# Patient Record
Sex: FEMALE | Race: WHITE | HISPANIC OR LATINO | Employment: FULL TIME | URBAN - METROPOLITAN AREA
[De-identification: names, ages, dates, MRNs, and addresses within clinical notes are randomized per-mention and may not be internally consistent; named-entity substitution may affect disease eponyms.]

---

## 2023-08-14 ENCOUNTER — PRE-ADMISSION TESTING (OUTPATIENT)
Dept: PREADMISSION TESTING | Facility: HOSPITAL | Age: 38
End: 2023-08-14
Payer: COMMERCIAL

## 2023-08-14 PROCEDURE — 93005 ELECTROCARDIOGRAM TRACING: CPT

## 2023-08-14 NOTE — PAT
Blood work from Pt echart faxed along with EKG to [facility].     [facility] instruction sheet reviewed with pt and  who verbalized understanding

## 2023-08-18 LAB
QT INTERVAL: 398 MS
QTC INTERVAL: 417 MS

## 2023-08-25 ENCOUNTER — LAB REQUISITION (OUTPATIENT)
Dept: LAB | Facility: HOSPITAL | Age: 38
End: 2023-08-25
Payer: COMMERCIAL

## 2023-08-25 DIAGNOSIS — K82.8 OTHER SPECIFIED DISEASES OF GALLBLADDER: ICD-10-CM

## 2023-08-25 PROCEDURE — 88304 TISSUE EXAM BY PATHOLOGIST: CPT

## 2023-08-29 LAB — REF LAB TEST METHOD: NORMAL

## 2023-08-31 ENCOUNTER — HOSPITAL ENCOUNTER (OUTPATIENT)
Dept: CARDIOLOGY | Facility: HOSPITAL | Age: 38
Discharge: HOME | End: 2023-08-31
Payer: COMMERCIAL

## 2023-08-31 ENCOUNTER — TRANSCRIBE ORDERS (OUTPATIENT)
Dept: ADMINISTRATIVE | Facility: HOSPITAL | Age: 38
End: 2023-08-31
Payer: COMMERCIAL

## 2023-08-31 VITALS — BODY MASS INDEX: 28 KG/M2 | HEIGHT: 63 IN | WEIGHT: 158 LBS

## 2023-08-31 DIAGNOSIS — M79.604 RIGHT LEG PAIN: Primary | ICD-10-CM

## 2023-08-31 DIAGNOSIS — M79.604 RIGHT LEG PAIN: ICD-10-CM

## 2023-08-31 LAB
BH CV LOWER VASCULAR LEFT COMMON FEMORAL AUGMENT: NORMAL
BH CV LOWER VASCULAR LEFT COMMON FEMORAL COMPRESS: NORMAL
BH CV LOWER VASCULAR LEFT COMMON FEMORAL PHASIC: NORMAL
BH CV LOWER VASCULAR LEFT COMMON FEMORAL SPONT: NORMAL
BH CV LOWER VASCULAR RIGHT COMMON FEMORAL AUGMENT: NORMAL
BH CV LOWER VASCULAR RIGHT COMMON FEMORAL COMPRESS: NORMAL
BH CV LOWER VASCULAR RIGHT COMMON FEMORAL PHASIC: NORMAL
BH CV LOWER VASCULAR RIGHT COMMON FEMORAL SPONT: NORMAL
BH CV LOWER VASCULAR RIGHT DISTAL FEMORAL AUGMENT: NORMAL
BH CV LOWER VASCULAR RIGHT DISTAL FEMORAL COMPRESS: NORMAL
BH CV LOWER VASCULAR RIGHT DISTAL FEMORAL PHASIC: NORMAL
BH CV LOWER VASCULAR RIGHT DISTAL FEMORAL SPONT: NORMAL
BH CV LOWER VASCULAR RIGHT GASTRONEMIUS COMPRESS: NORMAL
BH CV LOWER VASCULAR RIGHT GREATER SAPH AK COMPRESS: NORMAL
BH CV LOWER VASCULAR RIGHT GREATER SAPH BK COMPRESS: NORMAL
BH CV LOWER VASCULAR RIGHT LESSER SAPH COMPRESS: NORMAL
BH CV LOWER VASCULAR RIGHT MID FEMORAL AUGMENT: NORMAL
BH CV LOWER VASCULAR RIGHT MID FEMORAL COMPRESS: NORMAL
BH CV LOWER VASCULAR RIGHT MID FEMORAL PHASIC: NORMAL
BH CV LOWER VASCULAR RIGHT MID FEMORAL SPONT: NORMAL
BH CV LOWER VASCULAR RIGHT PERONEAL AUGMENT: NORMAL
BH CV LOWER VASCULAR RIGHT PERONEAL COMPRESS: NORMAL
BH CV LOWER VASCULAR RIGHT POPLITEAL AUGMENT: NORMAL
BH CV LOWER VASCULAR RIGHT POPLITEAL COMPRESS: NORMAL
BH CV LOWER VASCULAR RIGHT POPLITEAL PHASIC: NORMAL
BH CV LOWER VASCULAR RIGHT POPLITEAL SPONT: NORMAL
BH CV LOWER VASCULAR RIGHT POSTERIOR TIBIAL AUGMENT: NORMAL
BH CV LOWER VASCULAR RIGHT POSTERIOR TIBIAL COMPRESS: NORMAL
BH CV LOWER VASCULAR RIGHT PROFUNDA FEMORAL AUGMENT: NORMAL
BH CV LOWER VASCULAR RIGHT PROFUNDA FEMORAL PHASIC: NORMAL
BH CV LOWER VASCULAR RIGHT PROFUNDA FEMORAL SPONT: NORMAL
BH CV LOWER VASCULAR RIGHT PROXIMAL FEMORAL AUGMENT: NORMAL
BH CV LOWER VASCULAR RIGHT PROXIMAL FEMORAL COMPRESS: NORMAL
BH CV LOWER VASCULAR RIGHT PROXIMAL FEMORAL PHASIC: NORMAL
BH CV LOWER VASCULAR RIGHT PROXIMAL FEMORAL SPONT: NORMAL
BH CV LOWER VASCULAR RIGHT SAPHENOFEMORAL JUNCTION AUGMENT: NORMAL
BH CV LOWER VASCULAR RIGHT SAPHENOFEMORAL JUNCTION COMPRESS: NORMAL
BH CV LOWER VASCULAR RIGHT SAPHENOFEMORAL JUNCTION PHASIC: NORMAL
BH CV LOWER VASCULAR RIGHT SAPHENOFEMORAL JUNCTION SPONT: NORMAL

## 2023-08-31 PROCEDURE — 93971 EXTREMITY STUDY: CPT

## 2024-07-31 ENCOUNTER — TRANSCRIBE ORDERS (OUTPATIENT)
Dept: ADMINISTRATIVE | Facility: HOSPITAL | Age: 39
End: 2024-07-31
Payer: COMMERCIAL

## 2024-07-31 DIAGNOSIS — R10.33 PERIUMBILICAL PAIN: Primary | ICD-10-CM

## 2024-08-05 ENCOUNTER — HOSPITAL ENCOUNTER (OUTPATIENT)
Dept: CT IMAGING | Facility: HOSPITAL | Age: 39
Discharge: HOME | End: 2024-08-05
Admitting: SURGERY
Payer: COMMERCIAL

## 2024-08-05 DIAGNOSIS — R10.33 PERIUMBILICAL PAIN: ICD-10-CM

## 2024-08-05 PROCEDURE — 74176 CT ABD & PELVIS W/O CONTRAST: CPT

## 2024-10-23 ENCOUNTER — LAB REQUISITION (OUTPATIENT)
Dept: LAB | Facility: HOSPITAL | Age: 39
End: 2024-10-23
Payer: COMMERCIAL

## 2024-10-23 DIAGNOSIS — N92.0 EXCESSIVE AND FREQUENT MENSTRUATION WITH REGULAR CYCLE: ICD-10-CM

## 2024-10-23 PROCEDURE — 88305 TISSUE EXAM BY PATHOLOGIST: CPT | Performed by: OBSTETRICS & GYNECOLOGY

## 2024-10-24 LAB
CYTO UR: NORMAL
LAB AP CASE REPORT: NORMAL
LAB AP CLINICAL INFORMATION: NORMAL
PATH REPORT.FINAL DX SPEC: NORMAL
PATH REPORT.GROSS SPEC: NORMAL

## 2025-06-02 ENCOUNTER — OFFICE VISIT (OUTPATIENT)
Age: 40
End: 2025-06-02
Payer: COMMERCIAL

## 2025-06-02 VITALS
OXYGEN SATURATION: 100 % | SYSTOLIC BLOOD PRESSURE: 100 MMHG | TEMPERATURE: 97.3 F | HEIGHT: 63 IN | DIASTOLIC BLOOD PRESSURE: 62 MMHG | HEART RATE: 77 BPM | WEIGHT: 147.4 LBS | BODY MASS INDEX: 26.12 KG/M2

## 2025-06-02 DIAGNOSIS — K21.9 GERD WITHOUT ESOPHAGITIS: ICD-10-CM

## 2025-06-02 DIAGNOSIS — J30.89 NON-SEASONAL ALLERGIC RHINITIS, UNSPECIFIED TRIGGER: ICD-10-CM

## 2025-06-02 DIAGNOSIS — R06.02 SOB (SHORTNESS OF BREATH): Primary | ICD-10-CM

## 2025-06-02 RX ORDER — PHENAZOPYRIDINE HYDROCHLORIDE 200 MG/1
1 TABLET, FILM COATED ORAL 3 TIMES DAILY
COMMUNITY
Start: 2025-03-22 | End: 2025-06-02

## 2025-06-02 RX ORDER — LISINOPRIL 5 MG/1
1 TABLET ORAL DAILY
COMMUNITY

## 2025-06-02 RX ORDER — POLYETHYLENE GLYCOL 3350 17 G/17G
POWDER, FOR SOLUTION ORAL
COMMUNITY
Start: 2025-03-04

## 2025-06-02 RX ORDER — TIRZEPATIDE 2.5 MG/.5ML
INJECTION, SOLUTION SUBCUTANEOUS
COMMUNITY
Start: 2025-05-28

## 2025-06-02 RX ORDER — NITROFURANTOIN MACROCRYSTALS 100 MG/1
CAPSULE ORAL
COMMUNITY
Start: 2025-05-16

## 2025-06-02 RX ORDER — IRON POLYSACCHARIDE COMPLEX 150 MG
1 CAPSULE ORAL DAILY
COMMUNITY
Start: 2025-05-16

## 2025-06-02 RX ORDER — OMEPRAZOLE 20 MG/1
CAPSULE, DELAYED RELEASE ORAL
COMMUNITY
Start: 2025-05-28

## 2025-06-02 RX ORDER — LEVONORGESTREL/ETHINYL ESTRADIOL 2.6; 2.3 MG/1; MG/1
1 PATCH TRANSDERMAL
COMMUNITY
End: 2025-06-02

## 2025-06-02 RX ORDER — FLUCONAZOLE 200 MG/1
TABLET ORAL SEE ADMIN INSTRUCTIONS
COMMUNITY
Start: 2025-03-22

## 2025-06-02 RX ORDER — CETIRIZINE HYDROCHLORIDE 10 MG/1
10 TABLET ORAL DAILY
COMMUNITY

## 2025-06-02 RX ORDER — NORELGESTROMIN AND ETHINYL ESTRADIOL 35; 150 UG/MG; UG/MG
1 PATCH TRANSDERMAL
COMMUNITY
End: 2025-06-02

## 2025-06-02 NOTE — PROGRESS NOTES
[facility] Pulmonary Initial Evaluation    CHIEF COMPLAINT    Cough    Referred by:  HONORIO Eid    HISTORY OF PRESENT ILLNESS    Leigh H Humes is a 39 y.o.female here today for a evaluation of her persistent cough and shortness of breath.  She states that in April she developed a cough and felt poorly went to her PCPs office.  She had a low oxygen concentration and low blood pressure and elevated heart rate.  She was sent to [facility] and seen in the ED.  It was felt that her symptoms were related to a suspected UTI.  She was treated with antibiotics and her symptoms improved and she was discharged home later that evening.  She states that she felt better after she was treated but her cough came back.  She states now that it is not that bad.  She is seeing urology in the near future for her recurrent UTIs.    Her PCP was concerned and wanted her evaluated here.    She denies any childhood asthma or recurrent infections as a child.  She did have pneumonia at the age of 9.  She denies any chemical or environmental exposures in the past.  She denies any history of lung cancer in first relatives.  She is a lifetime non-smoker.  She does not vape.  She is currently a manager at a [employer].    She has seasonal allergies and will take medication as needed.    She typically coughs up clear secretions.  She denies any hemoptysis.  She denies any fever, chills or night sweats.  She sometimes will cough at nighttime.    She does have reflux symptoms.  She does take omeprazole most of the time.  Sometimes she would forget to take the medication.  She does have some breakthrough symptoms.    She denies any sleeping difficulties.  She does not drink a lot of caffeine.  She tries to limit her caffeine use at nighttime.    She denies any chest pain.  She was told she had a murmur in the past and then had an echocardiogram that showed no evidence of a murmur.  She denies any chest tenderness.  She denies  any palpitations.  She denies any lower extremity edema or calf tenderness.    She currently uses no inhalers.    She denies any shortness of breath with exertion.  She exercises some.    She is accompanied today by her family.    Patient Active Problem List   Diagnosis    SOB (shortness of breath)    GERD without esophagitis    Non-seasonal allergic rhinitis       Allergies   Allergen Reactions    Compazine [Prochlorperazine] Unknown - Low Severity    Morphine Other (See Comments)    Sulfa Antibiotics Other (See Comments)       Current Outpatient Medications:     cetirizine (zyrTEC) 10 MG tablet, Take 1 tablet by mouth Daily., Disp: , Rfl:     EQ ClearLax 17 GM/SCOOP powder, MIX 17 GRAMS (1 SCOOP) OF POWDER IN 8 OUNCES OF LIQUID AND DRINK ONCE DAILY, Disp: , Rfl:     Ferrex 150 150 MG capsule, Take 1 capsule by mouth Daily., Disp: , Rfl:     fluconazole (DIFLUCAN) 200 MG tablet, See Admin Instructions., Disp: , Rfl:     HYDROXYZINE HCL PO, hydrOXYzine HCl, Disp: , Rfl:     lisinopril (PRINIVIL,ZESTRIL) 5 MG tablet, Take 1 tablet by mouth Daily., Disp: , Rfl:     Mounjaro 2.5 MG/0.5ML solution auto-injector, , Disp: , Rfl:     nitrofurantoin (MACRODANTIN) 100 MG capsule, TAKE 1 CAPSULE BY MOUTH ONCE DAILY ON DAYS OF ACTIVITY AS DIRECTED, Disp: , Rfl:     omeprazole (priLOSEC) 20 MG capsule, , Disp: , Rfl:   MEDICATION LIST AND ALLERGIES REVIEWED.    Social History     Tobacco Use    Smoking status: Never     Passive exposure: Never    Smokeless tobacco: Never   Vaping Use    Vaping status: Never Used   Substance Use Topics    Alcohol use: Not Currently    Drug use: Never       FAMILY AND SOCIAL HISTORY REVIEWED.    Review of Systems   Constitutional:  Negative for activity change, appetite change, fatigue, fever and unexpected weight change.   HENT:  Negative for congestion, postnasal drip, rhinorrhea, sinus pressure, sore throat and voice change.    Eyes:  Negative for visual disturbance.   Respiratory:  Positive  "for cough. Negative for chest tightness, shortness of breath and wheezing.    Cardiovascular:  Negative for chest pain, palpitations and leg swelling.   Gastrointestinal:  Negative for abdominal distention, abdominal pain, nausea and vomiting.   Endocrine: Negative for cold intolerance and heat intolerance.   Genitourinary:  Negative for difficulty urinating and urgency.   Musculoskeletal:  Negative for arthralgias, back pain and neck pain.   Skin:  Negative for color change and pallor.   Allergic/Immunologic: Negative for environmental allergies and food allergies.   Neurological:  Negative for dizziness, syncope, weakness and light-headedness.   Hematological:  Negative for adenopathy. Does not bruise/bleed easily.   Psychiatric/Behavioral:  Negative for agitation and behavioral problems.    .    /62   Pulse 77   Temp 97.3 °F (36.3 °C)   Ht 158.8 cm (62.5\")   Wt 66.9 kg (147 lb 6.4 oz)   SpO2 100% Comment: Room air at rest  BMI 26.53 kg/m²     Immunization History   Administered Date(s) Administered    COVID-19 (MODERNA) 1st,2nd,3rd Dose Monovalent 03/09/2021, 04/06/2021    COVID-19 (MODERNA) Monovalent Original Booster 12/07/2021       Physical Exam  Vitals and nursing note reviewed.   Constitutional:       Appearance: She is well-developed. She is not diaphoretic.   HENT:      Head: Normocephalic and atraumatic.   Eyes:      Pupils: Pupils are equal, round, and reactive to light.   Neck:      Thyroid: No thyromegaly.   Cardiovascular:      Rate and Rhythm: Normal rate and regular rhythm.      Heart sounds: Normal heart sounds. No murmur heard.     No friction rub. No gallop.   Pulmonary:      Effort: Pulmonary effort is normal. No respiratory distress.      Breath sounds: Normal breath sounds. No wheezing or rales.   Chest:      Chest wall: No tenderness.   Abdominal:      General: Bowel sounds are normal.      Palpations: Abdomen is soft.      Tenderness: There is no abdominal tenderness. "   Musculoskeletal:         General: No swelling. Normal range of motion.      Cervical back: Normal range of motion and neck supple.   Lymphadenopathy:      Cervical: No cervical adenopathy.   Skin:     General: Skin is warm and dry.      Capillary Refill: Capillary refill takes less than 2 seconds.   Neurological:      Mental Status: She is alert and oriented to person, place, and time.   Psychiatric:         Mood and Affect: Mood normal.         Behavior: Behavior normal.         RESULTS    Spirometry Interpretation: FVC 4.34 123% predicted, FEV1 3.67 126% predicted, FEV1/FVC 84% predicted, TLC 5.75 116% predicted, DLCO 112% predicted, no obstruction, no restriction normal diffusion    6-minute walk test: Patient ambulated 1400 feet never desaturated below 95%, she does not meet qualifications for oxygen with activity, her percent of predicted was 69%.    XR Chest 1 View  Result Date: 4/3/2025  No acute findings. CRITICAL RESULT:   No. COMMUNICATION: Per this written report. Drafted by Nuvia Estrella MD on 4/3/2025 7:45 PM Final report signed by Nuvia Estrella MD on 4/3/2025 7:46 PM    PROBLEM LIST    Problem List Items Addressed This Visit          Allergies and Adverse Reactions    Non-seasonal allergic rhinitis       Gastrointestinal Abdominal     GERD without esophagitis    Relevant Medications    omeprazole (priLOSEC) 20 MG capsule       Pulmonary and Pneumonias    SOB (shortness of breath) - Primary    Relevant Orders    6 Minute Walk Test (Completed)    Spirometry with Diffusion Capacity & Lung Volumes (Completed)         DISCUSSION    Ms. Humes was here for an initial evaluation of her cough and shortness of breath.  We did review her PFTs in the office today she has no obstruction or restriction.  She did have a difficult time completing the testing correctly.  I did advise her that she does not need any inhalers at this time.    We reviewed her 6-minute walk test in the office today and she does not  meet qualifications for oxygen with activity.    We also reviewed her chest x-ray that was performed in April that showed no acute findings.  We did discuss performing an HRCT to further investigate her shortness of breath but she would like to hold off on this for now.    We discussed the 3 most common causes of a cough including allergies, GERD and asthma.  I do suspect that GERD is contributing to some of her symptoms.  We did discuss strict reflux precautions such as elevating the head the bed at night, not eating to 3 hours before bed and avoiding food to trigger reflux symptoms.  She will continue the omeprazole daily.  I did advise her if she continues to have breakthrough symptoms she needs to double the dose.    I did encourage her to take an allergy pill daily.    I also encouraged her to try to get at least 15 minutes of regular exercise daily.    She was complaining of some lymph nodes in her neck and I did not notice any extreme swelling of the area in the back of her neck.  I did advise her that if they were to continue bothering her she could follow-up with her PCP.    She would like to follow-up as needed in our office.    I personally spent a total of 34 minutes on patient visit today including chart review, face to face with the patient obtaining the history and physical exam, review of pertinent images and tests, counseling and discussion and/or coordination of care as described above, and documentation.  Total time excludes time spent on other separate services such as performing procedures or test interpretation, if applicable.        HONORIO Boggs  06/02/202515:40 EDT  Electronically signed     Please note that portions of this note were completed with a voice recognition program.        CC: Tati Nation, HONORIO

## 2025-06-04 ENCOUNTER — OFFICE VISIT (OUTPATIENT)
Dept: UROLOGY | Facility: CLINIC | Age: 40
End: 2025-06-04
Payer: COMMERCIAL

## 2025-06-04 VITALS
TEMPERATURE: 97.4 F | HEIGHT: 63 IN | WEIGHT: 147.49 LBS | SYSTOLIC BLOOD PRESSURE: 106 MMHG | OXYGEN SATURATION: 100 % | DIASTOLIC BLOOD PRESSURE: 50 MMHG | BODY MASS INDEX: 26.13 KG/M2 | HEART RATE: 71 BPM

## 2025-06-04 DIAGNOSIS — M62.89 PELVIC FLOOR DYSFUNCTION IN FEMALE: ICD-10-CM

## 2025-06-04 DIAGNOSIS — N30.10 BLADDER PAIN SYNDROME: Primary | ICD-10-CM

## 2025-06-04 DIAGNOSIS — N89.8 VAGINAL ITCHING: ICD-10-CM

## 2025-06-04 LAB
BILIRUB BLD-MCNC: NEGATIVE MG/DL
CLARITY, POC: CLEAR
COLOR UR: YELLOW
EXPIRATION DATE: ABNORMAL
GLUCOSE UR STRIP-MCNC: NEGATIVE MG/DL
KETONES UR QL: NEGATIVE
LEUKOCYTE EST, POC: NEGATIVE
Lab: ABNORMAL
NITRITE UR-MCNC: NEGATIVE MG/ML
PH UR: 6 [PH] (ref 5–8)
PROT UR STRIP-MCNC: NEGATIVE MG/DL
RBC # UR STRIP: ABNORMAL /UL
SP GR UR: 1 (ref 1–1.03)
UROBILINOGEN UR QL: ABNORMAL

## 2025-06-04 RX ORDER — ESTRADIOL 0.1 MG/G
CREAM VAGINAL
Qty: 42.5 G | Refills: 3 | Status: SHIPPED | OUTPATIENT
Start: 2025-06-04

## 2025-06-04 RX ORDER — HYDROXYZINE HYDROCHLORIDE 25 MG/1
25 TABLET, FILM COATED ORAL NIGHTLY
Qty: 30 TABLET | Refills: 3 | Status: SHIPPED | OUTPATIENT
Start: 2025-06-04

## 2025-06-04 RX ORDER — PHENAZOPYRIDINE HYDROCHLORIDE 100 MG/1
100 TABLET, FILM COATED ORAL 3 TIMES DAILY PRN
Qty: 30 TABLET | Refills: 0 | Status: SHIPPED | OUTPATIENT
Start: 2025-06-04

## 2025-06-04 NOTE — PROGRESS NOTES
A size 14F temporary catheter inserted and bladder cocktail (heparin, 8.4% Sodium Bicarbonate, 2% Lidocaine, and Kenalog-40) given per provider prescription. Indication: Lower urinary symptoms. Procedure and purpose of catheter and bladder cocktail explained to patient. Patient denies allergies to iodine, latex, orthopedic limitations, or previous genitourinary surgeries. Patient verbalized no discomfort or pain during the procedure. Mar-care provided before and after procedure.  Patient laid in supine position for 10 minutes comfortably; instructed the patient to notify the clinical staff if develops any bladder pain, discomfort, or spasms. Patient verbalized understanding.    Patient tolerated well but while she was checking out she did get light headed. She stated she was type 2 diabetes and she had not ate since last night so she does think her blood sugar was down she was given some water and mints to help and she then stated she was feeling better and her  was driving her so she left and was told if she needed anything to give us a call.    Suresh LEVINE

## 2025-06-04 NOTE — PROGRESS NOTES
Office Visit     Patient Name: Leigh H Humes  : 1985   MRN: [id number]     Patient or patient representative verbalized consent for the use of Ambient Listening during the visit with  HONORIO Hooper for chart documentation. 2025  12:25 EDT    Chief Complaint:   Chief Complaint   Patient presents with    Establish Care     Suspected UTI, interstitial cystitis and recurrent UTI     Referring Provider: Tati Nation APRN    Primary Care Provider: Tati Nation APRN     History of Present Illness  The patient presents for evaluation of interstitial cystitis, constipation, and pelvic floor dysfunction.    Interstitial Cystitis  - History of recurrent UTIs since youth, worsening over the past few years.  - Hospitalized at [facility] in 2025 and 2025 due to hypotension (80/60), tachycardia, and hypoxemia (low 90s).  - Diagnosed with an inflamed bladder and residual UTI, later ruled out as the primary cause.  - Treated with antibiotics and fluids.  - Under Tati's care for interstitial cystitis, with beneficial treatment.  - No recent episodes in the past few weeks.  - Weekly treatments effective, but experiences burning sensations a few times a month.  - Urinates every couple of hours, no urgency or leakage.  - Reports nocturia at least once per night.  - Consumes approximately four bottles of water daily, limited caffeine intake to one or two servings.  - No hematuria, except for vaginal spotting yesterday.  - Occasional dyspareunia, no bladder burning during urination.  - Has not used hydroxyzine for over a year.  - Finds Pyridium and Uribel helpful.  - CT scan with contrast revealed bladder inflammation.  - Four bladder cocktails to date.    Constipation  - Attributed to Mounjaro.  - No bowel movement in several days.  - Started MiraLAX last night.    Pelvic Floor Dysfunction  - No prolapse symptoms.  - Notes incomplete bladder emptying, requiring  catheterization.  - Experienced yeast infections due to prolonged antibiotic use.    Supplemental information: No menstrual period in months following endometrial ablation in 10/2024. Retains uterus, experienced external itching and dryness. Not consistently using birth control.    MEDICATIONS  Current: Mounjaro, MiraLAX, Zyrtec      Subjective   Review of System:   As noted in HPI.    Past Medical History:   Diagnosis Date    Diabetes     UTI (urinary tract infection)     Chronic     Past Surgical History:   Procedure Laterality Date     SECTION  10/15/2014     SECTION  2016    CHOLECYSTECTOMY  2024    DILATATION AND CURETTAGE  2011    ENDOMETRIAL ABLATION  10/01/2024     Family History   Problem Relation Age of Onset    Migraines Mother     Seizures Mother     Diabetes Father     Neuropathy Father     Hypertension Father     Heart disease Father      Social History     Socioeconomic History    Marital status:    Tobacco Use    Smoking status: Never     Passive exposure: Never    Smokeless tobacco: Never   Vaping Use    Vaping status: Never Used   Substance and Sexual Activity    Alcohol use: Not Currently    Drug use: Never    Sexual activity: Defer       Current Outpatient Medications:     cetirizine (zyrTEC) 10 MG tablet, Take 1 tablet by mouth Daily., Disp: , Rfl:     EQ ClearLax 17 GM/SCOOP powder, MIX 17 GRAMS (1 SCOOP) OF POWDER IN 8 OUNCES OF LIQUID AND DRINK ONCE DAILY, Disp: , Rfl:     lisinopril (PRINIVIL,ZESTRIL) 5 MG tablet, Take 1 tablet by mouth Daily., Disp: , Rfl:     Mounjaro 2.5 MG/0.5ML solution auto-injector, , Disp: , Rfl:     nitrofurantoin (MACRODANTIN) 100 MG capsule, TAKE 1 CAPSULE BY MOUTH ONCE DAILY ON DAYS OF ACTIVITY AS DIRECTED, Disp: , Rfl:     omeprazole (priLOSEC) 20 MG capsule, , Disp: , Rfl:     estradiol (ESTRACE) 0.1 MG/GM vaginal cream, Apply 0.5 g 3 nights weekly at bed time, Disp: 42.5 g, Rfl: 3    Ferrex 150 150 MG capsule, Take 1  "capsule by mouth Daily. (Patient not taking: Reported on 6/4/2025), Disp: , Rfl:     fluconazole (DIFLUCAN) 200 MG tablet, See Admin Instructions. (Patient not taking: Reported on 6/4/2025), Disp: , Rfl:     hydrOXYzine (ATARAX) 25 MG tablet, Take 1 tablet by mouth Every Night., Disp: 30 tablet, Rfl: 3    phenazopyridine (Pyridium) 100 MG tablet, Take 1 tablet by mouth 3 (Three) Times a Day As Needed for Bladder Spasms., Disp: 30 tablet, Rfl: 0  No current facility-administered medications for this visit.    Allergies   Allergen Reactions    Compazine [Prochlorperazine] Unknown - Low Severity    Morphine Other (See Comments)    Sulfa Antibiotics Other (See Comments)     Objective   Visit Vitals  /50   Pulse 71   Temp 97.4 °F (36.3 °C)   Ht 158.8 cm (62.52\")   Wt 66.9 kg (147 lb 7.8 oz)   SpO2 100%   BMI 26.53 kg/m²        Body mass index is 26.53 kg/m².     Physical Exam  Vitals and nursing note reviewed. Exam conducted with a chaperone present.   Constitutional:       General: She is not in acute distress.     Appearance: Normal appearance. She is not ill-appearing.   Pulmonary:      Effort: Pulmonary effort is normal. No respiratory distress.   Genitourinary:     Comments: Normal rugation, pale pink vulvar tissue.  No lesions. Red inflamed tissue at introitus no abnormal discharge noted        Skin:     General: Skin is warm and dry.   Neurological:      General: No focal deficit present.      Mental Status: She is alert and oriented to person, place, and time.   Psychiatric:         Mood and Affect: Mood normal.         Behavior: Behavior normal.          Labs  Lab Results   Component Value Date    COLORU Yellow 06/04/2025    CLARITYU Clear 06/04/2025    SPECGRAV 1.005 06/04/2025    PHUR 6.0 06/04/2025    LEUKOCYTESUR Negative 06/04/2025    NITRITE Negative 06/04/2025    PROTEINPOCUA Negative 06/04/2025    GLUCOSEUR Negative 06/04/2025    KETONESU Negative 06/04/2025    UROBILINOGEN 0.2 E.U./dL 06/04/2025 " "   BILIRUBINUR Negative 06/04/2025    RBCUR Small (A) 06/04/2025      No results found for: \"WBCUA\", \"RBCUA\", \"BACTERIA\", \"HYALCASTU\", \"SQUAMEPIUA\"     No results found for: \"WBC\", \"HGB\", \"HCT\", \"MCV\", \"PLT\"  No results found for: \"GLUCOSE\", \"CALCIUM\", \"NA\", \"K\", \"CO2\", \"CL\", \"BUN\", \"CREATININE\", \"EGFR\", \"BCR\", \"ANIONGAP\", \"ALT\", \"AST\"  No results found for: \"HGBA1C\"  No results found for: \"URICACIDSTN\", \"ERMN5OBTMMQ\", \"TPCU4IZUJE\", \"LABMAGN\"  No results found for: \"AWVU00ZF\", \"CAION\", \"PTH\", \"URICACID\"  No results found for: \"CYSTINE\", \"URINEVOLUM\", \"CALCIUMUR\", \"OXALATEU\", \"CITRATEUR\", \"LABPH\", \"URICUR\", \"NAUR\", \"KUR\", \"MAGNESIUMUR\", \"PHOSUR\", \"AMMONIUMUR\", \"CLUR\", \"WNKTSVDMH47V\", \"UREAUR\", \"LABCREAUR\"    No results found for: \"ATOPOBIUMV\", \"BVAB2\", \"MEGASPHAER\", \"CALBICANSN\", \"CGLABRATAN\", \"CPARAPSILOS\", \"CLUSITANIAE\", \"CKRUSEI\", \"TRICHVAG\", \"CHLAMNAA\", \"NGONORRHON\", \"UREAPLASMA\", \"MYCOPLASMAG\"    No results found for: \"FERRITIN\", \"FSH\", \"SEXMONB\", \"TSH\", \"FREET4\", \"T3FREE\", \"TPOABRFLX\", \"YPXKMQCH13\", \"LNQA63RC\", \"CORTISOL\", \"TLESTROGENS\", \"TESTOSTEROTT\", \"TESTFRE\", \"LIPIDEXCLUSI\"      Radiographic Studies  CT Abdomen Pelvis With Contrast  Result Date: 4/3/2025  * No acute abdominopelvic process. * Duplex left kidney. * Nonspecific subcentimeter myometrial cysts which could reflect adenomyosis. * Mild diffuse bladder wall thickening which could reflect underdistention versus a cystitis. CRITICAL RESULT:    No. COMMUNICATION: Per this written report. Drafted by Willy Funes MD on 4/3/2025 11:00 PM Final report signed by Willy Funes MD on 4/3/2025 11:08 PM    XR Chest 1 View  Result Date: 4/3/2025  No acute findings. CRITICAL RESULT:   No. COMMUNICATION: Per this written report. Drafted by Nuvia Estrella MD on 4/3/2025 7:45 PM Final report signed by Nuvia Estrella MD on 4/3/2025 7:46 PM      I have reviewed the above labs and imaging.     PVR  Post-void residual performed with ultrasound by staff and " interpreted by me - 59 mL    Assessment / Plan      Diagnoses and all orders for this visit:    1. Bladder pain syndrome (Primary)  -     POC Urinalysis Dipstick, Automated  -     estradiol (ESTRACE) 0.1 MG/GM vaginal cream; Apply 0.5 g 3 nights weekly at bed time  Dispense: 42.5 g; Refill: 3  -     phenazopyridine (Pyridium) 100 MG tablet; Take 1 tablet by mouth 3 (Three) Times a Day As Needed for Bladder Spasms.  Dispense: 30 tablet; Refill: 0  -     hydrOXYzine (ATARAX) 25 MG tablet; Take 1 tablet by mouth Every Night.  Dispense: 30 tablet; Refill: 3  -     Urinalysis With Microscopic - Urine, Clean Catch  -     heparin (porcine) 10,000 Units, lidocaine (XYLOCAINE) 2% 20 mL, sodium bicarbonate 8.4 % 6 mL, triamcinolone acetonide (KENALOG-40) 40 mg irrigation    2. Vaginal itching  -     NuSwab VG+ - Swab, Vagina; Future    3. Pelvic floor dysfunction in female  -     Ambulatory Referral to Physical Therapy for Evaluation & Treatment         Assessment & Plan  1. Interstitial cystitis:  - Symptoms consistent with interstitial cystitis, exacerbated by constipation  - Urinary retention present but not concerning  - No positive cultures, suggesting inflammation rather than bacterial infection  - Diet control crucial as certain foods can irritate the bladder  - Recommended aloe vera capsules daily, bladder cocktails , Pyridium for symptomatic relief  - Suggested elimination diet  - Urine sample for microscopy to rule out hematuria  - Advised to avoid bubble baths, use gentle soap, caution with washcloths due to thin skin  - Recommended double voiding for complete bladder emptying  - Prescribed hydroxyzine 25 mg nightly and Pyridium for symptomatic relief    2. Constipation:  - Increases UTI risk and exacerbates urinary symptoms  - Recommended daily fiber supplement  - If persistent, consider gastroenterology referral  - Suggested MiraLAX if no bowel movements for more than a couple of days    3. Pelvic floor  dysfunction:  - Contributing to interstitial cystitis symptoms  - Referred to [address] for pelvic floor physical therapy    4. Vaginal dryness:  - May contribute to external symptoms  - Prescribed estradiol cream, apply pea-sized amount externally three nights a week at bedtime, avoid intercourse afterwards    Follow-up:  - Follow up in 3 months    PROCEDURE  The patient underwent an endometrial ablation in 10/2024.     I spent a total of 60 minutes with the patient and the chart engaging in data gathering and interpretation, patient interaction, as well as counseling on the risks, benefits, and alternatives of the therapy and coordinating care.        Return in about 3 months (around 9/4/2025) for Diana.    Diana Mendoza, MSN, APRN, FNP-C  [facility]

## 2025-06-05 LAB
APPEARANCE UR: CLEAR
BACTERIA #/AREA URNS HPF: ABNORMAL /HPF
BILIRUB UR QL STRIP: NEGATIVE
CASTS URNS MICRO: ABNORMAL
COLOR UR: YELLOW
EPI CELLS #/AREA URNS HPF: ABNORMAL /HPF
GLUCOSE UR QL STRIP: NEGATIVE
HGB UR QL STRIP: ABNORMAL
KETONES UR QL STRIP: NEGATIVE
LEUKOCYTE ESTERASE UR QL STRIP: NEGATIVE
NITRITE UR QL STRIP: NEGATIVE
PH UR STRIP: 7 [PH] (ref 5–8)
PROT UR QL STRIP: NEGATIVE
RBC #/AREA URNS HPF: ABNORMAL /HPF
SP GR UR STRIP: ABNORMAL (ref 1–1.03)
UROBILINOGEN UR STRIP-MCNC: ABNORMAL MG/DL
WBC #/AREA URNS HPF: ABNORMAL /HPF

## 2025-06-06 LAB
A VAGINAE DNA VAG QL NAA+PROBE: NORMAL SCORE
APPEARANCE UR: CLEAR
BACTERIA #/AREA URNS HPF: ABNORMAL /HPF
BILIRUB UR QL STRIP: NEGATIVE
BVAB2 DNA VAG QL NAA+PROBE: NORMAL SCORE
C ALBICANS DNA VAG QL NAA+PROBE: NEGATIVE
C GLABRATA DNA VAG QL NAA+PROBE: NEGATIVE
C TRACH DNA SPEC QL NAA+PROBE: NEGATIVE
CASTS URNS MICRO: ABNORMAL
COLOR UR: YELLOW
EPI CELLS #/AREA URNS HPF: ABNORMAL /HPF
GLUCOSE UR QL STRIP: NEGATIVE
HGB UR QL STRIP: ABNORMAL
KETONES UR QL STRIP: NEGATIVE
LEUKOCYTE ESTERASE UR QL STRIP: NEGATIVE
MEGA1 DNA VAG QL NAA+PROBE: NORMAL SCORE
N GONORRHOEA DNA VAG QL NAA+PROBE: NEGATIVE
NITRITE UR QL STRIP: NEGATIVE
PH UR STRIP: 7.5 [PH] (ref 5–8)
PROT UR QL STRIP: NEGATIVE
RBC #/AREA URNS HPF: ABNORMAL /HPF
SP GR UR STRIP: ABNORMAL (ref 1–1.03)
T VAGINALIS DNA VAG QL NAA+PROBE: NEGATIVE
UROBILINOGEN UR STRIP-MCNC: ABNORMAL MG/DL
WBC #/AREA URNS HPF: ABNORMAL /HPF